# Patient Record
Sex: FEMALE | Race: WHITE | NOT HISPANIC OR LATINO | Employment: STUDENT | URBAN - METROPOLITAN AREA
[De-identification: names, ages, dates, MRNs, and addresses within clinical notes are randomized per-mention and may not be internally consistent; named-entity substitution may affect disease eponyms.]

---

## 2017-01-05 ENCOUNTER — OFFICE VISIT (OUTPATIENT)
Dept: PSYCHIATRY | Facility: CLINIC | Age: 22
End: 2017-01-05
Payer: COMMERCIAL

## 2017-01-05 VITALS
HEART RATE: 102 BPM | DIASTOLIC BLOOD PRESSURE: 62 MMHG | WEIGHT: 107 LBS | BODY MASS INDEX: 20.2 KG/M2 | HEIGHT: 61 IN | SYSTOLIC BLOOD PRESSURE: 106 MMHG

## 2017-01-05 DIAGNOSIS — F32.89 DEPRESSIVE DISORDER, NOT ELSEWHERE CLASSIFIED: ICD-10-CM

## 2017-01-05 DIAGNOSIS — F41.1 GENERALIZED ANXIETY DISORDER: Primary | ICD-10-CM

## 2017-01-05 PROCEDURE — 99214 OFFICE O/P EST MOD 30 MIN: CPT | Mod: S$GLB,,, | Performed by: PSYCHIATRY & NEUROLOGY

## 2017-01-05 PROCEDURE — 1159F MED LIST DOCD IN RCRD: CPT | Mod: S$GLB,,, | Performed by: PSYCHIATRY & NEUROLOGY

## 2017-01-05 PROCEDURE — 99999 PR PBB SHADOW E&M-EST. PATIENT-LVL III: CPT | Mod: PBBFAC,,, | Performed by: PSYCHIATRY & NEUROLOGY

## 2017-01-05 RX ORDER — PAROXETINE 10 MG/1
10 TABLET, FILM COATED ORAL EVERY MORNING
Qty: 30 TABLET | Refills: 1 | Status: SHIPPED | OUTPATIENT
Start: 2017-01-05 | End: 2017-02-09 | Stop reason: SDUPTHER

## 2017-01-05 NOTE — PROGRESS NOTES
01/05/2017  1:30 PM  Jolynn Bunch  [id number]          Psychiatry Clinic Note    SUBJECTIVE  Jolynn Bunch is a 21 y.o. old female who presents to clinic today for follow up of UMESH and depression. She notes that she is struggling with having her  away from home. She notes that she was happy to see him over the holidays. She notes that she has been somatizing her anxiety in the form of nausea and vomiting. She notes that this is a new experience for her. We discuss attempting a trial of Paxil 10 mg PO daily. She notes that she was formerly attempted on Lexapro and suffered from suicidal ideation. We discuss in depth the dangers of starting Paxil and it's similarities to Lexapro. We also discuss how she was on monotherapy with Lexapro when she experienced this suicidal ideation. The pt is able to contract for safety and plans to present to the ED if she experiences SI. We further discuss the risks, benefits, side effects, inherent unpredictability and alternatives to initiating this medication. She opts to initiate this medication and is noted to have capacity to make medical decisions. We plan to f/u in 4-6 weeks. Overall, she denies SI, HI and AVH.     PSYCHIATRIC ROS  Denies: delusions, paranoia,  periods of persistently elevated/expansive or irritable mood and/or increased goal directed behavior.    Issues/problems with:   Sleep Yes  Appetite changes no  Low energy no  Poor concentration no  Psychomotor agitation no  Suicidal ideation no  Depressed mood no  Anhedonia some  Anxiety yes  Worry yes      CURRENT HOME PSYCHIATRIC MEDICATIONS  Wellbutrin XL 450mg PO qdaily  Xanax 0.5mg PO qdaily PRN  Propranolol 10 mg PO TID PRN   Trazodone 50 mg PO QHS PRN insomnia    Patient reports that she is tolerating medications as prescribed without any adverse side effects.     MEDICAL ROS  General ROS: negative  ENT ROS: negative  Cardiovascular ROS: no chest pain or dyspnea on exertion  Respiratory ROS: no cough,  shortness of breath, or wheezing  Gastrointestinal ROS: no abdominal pain, change in bowel habits, or black or bloody stools  Neurological ROS: no TIA or stroke symptoms  Dermatological ROS: negative  Endocrine ROS: negative      OBJECTIVE    Vitals:    01/05/17 0753   BP: 106/62   Pulse: 102       MENTAL STATUS EXAM  Appearance: casually dressed  Behavior: cooperative  Speech: normal rate, tone and volume  Thought process: Linear  Thought content:Denies SKIP denies AVH  Mood: Good  Affect:  bright  Cognition: intact  Insight: intact  Judgment: intact    THERAPY    STATUS/PROGRESS Based on the examination today, the patient's problem(s) is/are worsening. New problems have not presented today. Co-morbidities are not complicating management of the primary condition. There are not active rule-out diagnoses for this patient at this time.       ASSESSMENT AND PLAN  UMESH, Depression    Continue with xanax 0.25 mg PO qdaily PRN  Continue wellbutrinXL to 450 mg PO qdaily  Continue Propranolol 10 mg PO TID PRN anxiety  Continue Trazodone 50 mg PO QHS PRN insomnia  Start Paxil 10 mg PO daily for anxiety and mood    Discussed diagnosis, risks and benefits of proposed treatment above vs alternative treatments vs no treatment, and potential side effects of these treatments. The patient expresses understanding of the above and displays the capacity to agree with this treatment given said understanding. Patient also agrees that, currently, the benefits outweigh the risks and would like to pursue treatment at this time.     Return to clinic 1.5 months or sooner if needed    Natalio Sheffield MD   Psychiatry Resident

## 2017-02-09 ENCOUNTER — OFFICE VISIT (OUTPATIENT)
Dept: PSYCHIATRY | Facility: CLINIC | Age: 22
End: 2017-02-09
Payer: COMMERCIAL

## 2017-02-09 VITALS
DIASTOLIC BLOOD PRESSURE: 57 MMHG | WEIGHT: 105 LBS | SYSTOLIC BLOOD PRESSURE: 112 MMHG | BODY MASS INDEX: 19.83 KG/M2 | HEIGHT: 61 IN | HEART RATE: 95 BPM

## 2017-02-09 DIAGNOSIS — F41.1 GENERALIZED ANXIETY DISORDER: Primary | ICD-10-CM

## 2017-02-09 PROCEDURE — 1160F RVW MEDS BY RX/DR IN RCRD: CPT | Mod: S$GLB,,, | Performed by: PSYCHIATRY & NEUROLOGY

## 2017-02-09 PROCEDURE — 99214 OFFICE O/P EST MOD 30 MIN: CPT | Mod: S$GLB,,, | Performed by: PSYCHIATRY & NEUROLOGY

## 2017-02-09 PROCEDURE — 99999 PR PBB SHADOW E&M-EST. PATIENT-LVL III: CPT | Mod: PBBFAC,,, | Performed by: PSYCHIATRY & NEUROLOGY

## 2017-02-09 RX ORDER — PAROXETINE 10 MG/1
10 TABLET, FILM COATED ORAL EVERY MORNING
Qty: 90 TABLET | Refills: 1 | Status: SHIPPED | OUTPATIENT
Start: 2017-02-09 | End: 2017-05-05 | Stop reason: SDUPTHER

## 2017-02-10 NOTE — PROGRESS NOTES
"02/10/2017  1:30 PM  Jolynn Bunch  [id number]          Psychiatry Clinic Note    SUBJECTIVE  Jolynn Bunch is a 21 y.o. old female who presents to clinic today for follow up of UMESH and depression. The pt notes that her anxiety is greatly improved with starting Paxil. She states "Is this how normal people feel; I'm doing great." She notes that she has been able to be more social and avoid seclusion. She also notes that she was surprised by her  who is home on a surprise visit from [employer] training. She notes that she has a strong desire to continue Paxil despite reporting a mild decrease in libido. We plan to continue her current medication regimen.  We plan to f/u in 4-6 weeks. Overall, she denies SI, HI and AVH.     PSYCHIATRIC ROS  Denies: delusions, paranoia,  periods of persistently elevated/expansive or irritable mood and/or increased goal directed behavior.    Issues/problems with:   Sleep Yes  Appetite changes no  Low energy no  Poor concentration no  Psychomotor agitation no  Suicidal ideation no  Depressed mood no  Anhedonia some  Anxiety yes  Worry yes      CURRENT HOME PSYCHIATRIC MEDICATIONS  Wellbutrin XL 450mg PO qdaily  Xanax 0.5mg PO qdaily PRN  Propranolol 10 mg PO TID PRN   Trazodone 50 mg PO QHS PRN insomnia  Paxil 10 mg PO daily    Patient reports that she is tolerating medications as prescribed without any adverse side effects.     MEDICAL ROS  General ROS: negative  ENT ROS: negative  Cardiovascular ROS: no chest pain or dyspnea on exertion  Respiratory ROS: no cough, shortness of breath, or wheezing  Gastrointestinal ROS: no abdominal pain, change in bowel habits, or black or bloody stools  Neurological ROS: no TIA or stroke symptoms  Dermatological ROS: negative  Endocrine ROS: negative      OBJECTIVE    Vitals:    02/09/17 0754   BP: (!) 112/57   Pulse: 95       MENTAL STATUS EXAM  Appearance: casually dressed  Behavior: cooperative  Speech: normal rate, tone and volume  Thought " process: Linear  Thought content:Denies SKIP denies AVH  Mood: Good  Affect:  bright  Cognition: intact  Insight: intact  Judgment: intact    THERAPY    STATUS/PROGRESS Based on the examination today, the patient's problem(s) is/are improving. New problems have not presented today. Co-morbidities are not complicating management of the primary condition. There are not active rule-out diagnoses for this patient at this time.     ASSESSMENT AND PLAN  UMESH, Depression    Continue with xanax 0.25 mg PO qdaily PRN  Continue wellbutrinXL to 450 mg PO qdaily  Continue Propranolol 10 mg PO TID PRN anxiety  Continue Trazodone 50 mg PO QHS PRN insomnia  Continue Paxil 10 mg PO daily for anxiety and mood    Discussed diagnosis, risks and benefits of proposed treatment above vs alternative treatments vs no treatment, and potential side effects of these treatments. The patient expresses understanding of the above and displays the capacity to agree with this treatment given said understanding. Patient also agrees that, currently, the benefits outweigh the risks and would like to pursue treatment at this time.     Return to clinic 1.5 months or sooner if needed    Natalio Sheffield MD   Psychiatry Resident

## 2017-03-24 ENCOUNTER — OFFICE VISIT (OUTPATIENT)
Dept: PSYCHIATRY | Facility: CLINIC | Age: 22
End: 2017-03-24
Payer: COMMERCIAL

## 2017-03-24 VITALS
HEART RATE: 109 BPM | SYSTOLIC BLOOD PRESSURE: 113 MMHG | WEIGHT: 109 LBS | DIASTOLIC BLOOD PRESSURE: 67 MMHG | HEIGHT: 61 IN | BODY MASS INDEX: 20.58 KG/M2

## 2017-03-24 DIAGNOSIS — F41.1 GENERALIZED ANXIETY DISORDER: Primary | ICD-10-CM

## 2017-03-24 PROCEDURE — 99214 OFFICE O/P EST MOD 30 MIN: CPT | Mod: S$GLB,,, | Performed by: PSYCHIATRY & NEUROLOGY

## 2017-03-24 PROCEDURE — 99999 PR PBB SHADOW E&M-EST. PATIENT-LVL III: CPT | Mod: PBBFAC,,, | Performed by: PSYCHIATRY & NEUROLOGY

## 2017-03-24 PROCEDURE — 1160F RVW MEDS BY RX/DR IN RCRD: CPT | Mod: S$GLB,,, | Performed by: PSYCHIATRY & NEUROLOGY

## 2017-03-24 RX ORDER — ALPRAZOLAM 0.25 MG/1
0.25 TABLET ORAL DAILY PRN
Qty: 90 TABLET | Refills: 1 | Status: SHIPPED | OUTPATIENT
Start: 2017-03-24 | End: 2017-05-05 | Stop reason: SDUPTHER

## 2017-03-24 NOTE — PROGRESS NOTES
03/24/2017  1:30 PM  Jolynn Bunch  [id number]          Psychiatry Clinic Note    SUBJECTIVE  Jolynn Bucnh is a 21 y.o. old female who presents to clinic today for follow up of UMESH and depression.     The pt notes that her anxiety is stable. She notes that she is doing well in school and coping well with her  being away. She is planning to move to [facility] after her graduation from college to be closer to her  who is being stationed in this state. She denies all side effects from her current medication regimen and wishes to continue the regimen. We plan to continue her current medication regimen.  We plan to f/u in 4-6 weeks. Overall, she denies SI, HI and AVH.     PSYCHIATRIC ROS  Denies: delusions, paranoia,  periods of persistently elevated/expansive or irritable mood and/or increased goal directed behavior.    Issues/problems with:   Sleep no  Appetite changes no  Low energy no  Poor concentration no  Psychomotor agitation no  Suicidal ideation no  Depressed mood no  Anhedonia some  Anxiety yes, significantly decreased  Worry yes      CURRENT HOME PSYCHIATRIC MEDICATIONS  Wellbutrin XL 450mg PO qdaily  Xanax 0.5mg PO qdaily PRN  Propranolol 10 mg PO TID PRN   Trazodone 50 mg PO QHS PRN insomnia  Paxil 10 mg PO daily    Patient reports that she is tolerating medications as prescribed without any adverse side effects.     MEDICAL ROS  General ROS: negative  ENT ROS: negative  Cardiovascular ROS: no chest pain or dyspnea on exertion  Respiratory ROS: no cough, shortness of breath, or wheezing  Gastrointestinal ROS: no abdominal pain, change in bowel habits, or black or bloody stools  Neurological ROS: no TIA or stroke symptoms  Dermatological ROS: negative  Endocrine ROS: negative      OBJECTIVE    Vitals:    03/24/17 1322   BP: 113/67   Pulse: 109       MENTAL STATUS EXAM  Appearance: casually dressed  Behavior: cooperative  Speech: normal rate, tone and volume  Thought process:  Linear  Thought content:Denies SKIP denies AVH  Mood: Good  Affect:  bright  Cognition: intact  Insight: intact  Judgment: intact    THERAPY    STATUS/PROGRESS Based on the examination today, the patient's problem(s) is/are improving. New problems have not presented today. Co-morbidities are not complicating management of the primary condition. There are not active rule-out diagnoses for this patient at this time.     ASSESSMENT AND PLAN  UMESH, Depression    Continue xanax 0.25 mg PO qdaily PRN  Continue wellbutrinXL to 450 mg PO qdaily  Continue Propranolol 10 mg PO TID PRN anxiety  Continue Trazodone 50 mg PO QHS PRN insomnia  Continue Paxil 10 mg PO daily for anxiety and mood    Discussed diagnosis, risks and benefits of proposed treatment above vs alternative treatments vs no treatment, and potential side effects of these treatments. The patient expresses understanding of the above and displays the capacity to agree with this treatment given said understanding. Patient also agrees that, currently, the benefits outweigh the risks and would like to pursue treatment at this time.     Return to clinic 1.5 months or sooner if needed    Natalio Sheffield MD   Psychiatry Resident

## 2017-05-05 ENCOUNTER — OFFICE VISIT (OUTPATIENT)
Dept: PSYCHIATRY | Facility: CLINIC | Age: 22
End: 2017-05-05
Payer: COMMERCIAL

## 2017-05-05 VITALS
WEIGHT: 113 LBS | HEART RATE: 97 BPM | HEIGHT: 61 IN | DIASTOLIC BLOOD PRESSURE: 52 MMHG | SYSTOLIC BLOOD PRESSURE: 93 MMHG | BODY MASS INDEX: 21.34 KG/M2

## 2017-05-05 DIAGNOSIS — F41.1 GENERALIZED ANXIETY DISORDER: ICD-10-CM

## 2017-05-05 DIAGNOSIS — F32.89 DEPRESSIVE DISORDER, NOT ELSEWHERE CLASSIFIED: Primary | ICD-10-CM

## 2017-05-05 PROCEDURE — 99999 PR PBB SHADOW E&M-EST. PATIENT-LVL II: CPT | Mod: PBBFAC,,, | Performed by: PSYCHIATRY & NEUROLOGY

## 2017-05-05 PROCEDURE — 99214 OFFICE O/P EST MOD 30 MIN: CPT | Mod: S$GLB,,, | Performed by: PSYCHIATRY & NEUROLOGY

## 2017-05-05 RX ORDER — BUPROPION HYDROCHLORIDE 150 MG/1
150 TABLET ORAL DAILY
Qty: 90 TABLET | Refills: 0 | Status: SHIPPED | OUTPATIENT
Start: 2017-05-05 | End: 2018-05-05

## 2017-05-05 RX ORDER — PAROXETINE 10 MG/1
10 TABLET, FILM COATED ORAL EVERY MORNING
Qty: 90 TABLET | Refills: 0 | Status: SHIPPED | OUTPATIENT
Start: 2017-05-05 | End: 2018-05-05

## 2017-05-05 RX ORDER — BUPROPION HYDROCHLORIDE 300 MG/1
300 TABLET ORAL DAILY
Qty: 90 TABLET | Refills: 0 | Status: SHIPPED | OUTPATIENT
Start: 2017-05-05 | End: 2018-05-05

## 2017-05-05 RX ORDER — TRAZODONE HYDROCHLORIDE 50 MG/1
50 TABLET ORAL NIGHTLY PRN
Qty: 90 TABLET | Refills: 0 | Status: SHIPPED | OUTPATIENT
Start: 2017-05-05 | End: 2018-05-05

## 2017-05-05 RX ORDER — ALPRAZOLAM 0.25 MG/1
0.25 TABLET ORAL DAILY PRN
Qty: 90 TABLET | Refills: 0 | Status: SHIPPED | OUTPATIENT
Start: 2017-05-05 | End: 2017-06-04

## 2017-05-05 RX ORDER — PROPRANOLOL HYDROCHLORIDE 10 MG/1
10 TABLET ORAL 3 TIMES DAILY PRN
Qty: 270 TABLET | Refills: 0 | Status: SHIPPED | OUTPATIENT
Start: 2017-05-05 | End: 2018-05-05

## 2017-05-05 NOTE — PROGRESS NOTES
05/05/2017  1:30 PM  Jolynn Bunch  [id number]          Psychiatry Clinic Note    SUBJECTIVE  Jolynn Bunch is a 21 y.o. old female who presents to clinic today for follow up of UMESH and depression.     The pt notes that her anxiety is stable. She notes that she is doing well in school and coping well with her  being away. She is planning to move to [facility] after her graduation from college to be closer to her  who is being stationed in this state. She denies all side effects from her current medication regimen and wishes to continue the regimen. We plan to continue her current medication regimen.  We plan this to be our last visit as she plans to move in the next 4 weeks. She remains involved in therapy. She plans to establish psychological and psychiatric care in [facility] through the . Overall, she denies SI, HI and AVH.     PSYCHIATRIC ROS  Denies: delusions, paranoia,  periods of persistently elevated/expansive or irritable mood and/or increased goal directed behavior.    Issues/problems with:   Sleep no  Appetite changes no  Low energy no  Poor concentration no  Psychomotor agitation no  Suicidal ideation no  Depressed mood no  Anhedonia some  Anxiety yes, significantly decreased  Worry yes      CURRENT HOME PSYCHIATRIC MEDICATIONS  Wellbutrin XL 450mg PO qdaily  Xanax 0.5mg PO qdaily PRN  Propranolol 10 mg PO TID PRN   Trazodone 50 mg PO QHS PRN insomnia  Paxil 10 mg PO daily    Patient reports that she is tolerating medications as prescribed without any adverse side effects.     MEDICAL ROS  General ROS: negative  ENT ROS: negative  Cardiovascular ROS: no chest pain or dyspnea on exertion  Respiratory ROS: no cough, shortness of breath, or wheezing  Gastrointestinal ROS: no abdominal pain, change in bowel habits, or black or bloody stools  Neurological ROS: no TIA or stroke symptoms  Dermatological ROS: negative  Endocrine ROS: negative      OBJECTIVE    Vitals:     05/05/17 1052   BP: (!) 93/52   Pulse: 97       MENTAL STATUS EXAM  Appearance: casually dressed   Behavior: cooperative  Speech: normal rate, tone and volume  Thought process: Linear  Thought content:Denies SKIP denies AVH  Mood: Good  Affect:  bright  Cognition: intact  Insight: intact  Judgment: intact    THERAPY    STATUS/PROGRESS Based on the examination today, the patient's problem(s) is/are improving. New problems have not presented today. Co-morbidities are not complicating management of the primary condition. There are not active rule-out diagnoses for this patient at this time.     ASSESSMENT AND PLAN  UMESH, Depression    Continue xanax 0.25 mg PO qdaily PRN  Continue wellbutrinXL to 450 mg PO qdaily  Continue Propranolol 10 mg PO TID PRN anxiety  Continue Trazodone 50 mg PO QHS PRN insomnia  Continue Paxil 10 mg PO daily for anxiety and mood    Discussed diagnosis, risks and benefits of proposed treatment above vs alternative treatments vs no treatment, and potential side effects of these treatments. The patient expresses understanding of the above and displays the capacity to agree with this treatment given said understanding. Patient also agrees that, currently, the benefits outweigh the risks and would like to pursue treatment at this time.     Return to clinic as needed     Natalio Sheffield MD   Psychiatry Resident

## 2020-01-20 ENCOUNTER — HOSPITAL ENCOUNTER (INPATIENT)
Dept: HOSPITAL 62 - LR | Age: 25
LOS: 4 days | Discharge: HOME | End: 2020-01-24
Attending: OBSTETRICS & GYNECOLOGY | Admitting: OBSTETRICS & GYNECOLOGY
Payer: OTHER GOVERNMENT

## 2020-01-20 DIAGNOSIS — F41.9: ICD-10-CM

## 2020-01-20 DIAGNOSIS — Z3A.39: ICD-10-CM

## 2020-01-20 DIAGNOSIS — O41.1230: ICD-10-CM

## 2020-01-20 LAB
ADD MANUAL DIFF: NO
APPEARANCE UR: (no result)
APTT PPP: YELLOW S
BARBITURATES UR QL SCN: NEGATIVE
BASOPHILS # BLD AUTO: 0 10^3/UL (ref 0–0.2)
BASOPHILS NFR BLD AUTO: 0.2 % (ref 0–2)
BILIRUB UR QL STRIP: NEGATIVE
EOSINOPHIL # BLD AUTO: 0.1 10^3/UL (ref 0–0.6)
EOSINOPHIL NFR BLD AUTO: 1.1 % (ref 0–6)
ERYTHROCYTE [DISTWIDTH] IN BLOOD BY AUTOMATED COUNT: 16.1 % (ref 11.5–14)
GLUCOSE UR STRIP-MCNC: NEGATIVE MG/DL
HCT VFR BLD CALC: 30.5 % (ref 36–47)
HGB BLD-MCNC: 10.3 G/DL (ref 12–15.5)
KETONES UR STRIP-MCNC: NEGATIVE MG/DL
LYMPHOCYTES # BLD AUTO: 1.2 10^3/UL (ref 0.5–4.7)
LYMPHOCYTES NFR BLD AUTO: 12.2 % (ref 13–45)
MCH RBC QN AUTO: 25.6 PG (ref 27–33.4)
MCHC RBC AUTO-ENTMCNC: 33.8 G/DL (ref 32–36)
MCV RBC AUTO: 76 FL (ref 80–97)
METHADONE UR QL SCN: NEGATIVE
MONOCYTES # BLD AUTO: 0.5 10^3/UL (ref 0.1–1.4)
MONOCYTES NFR BLD AUTO: 5.3 % (ref 3–13)
NEUTROPHILS # BLD AUTO: 7.8 10^3/UL (ref 1.7–8.2)
NEUTS SEG NFR BLD AUTO: 81.2 % (ref 42–78)
NITRITE UR QL STRIP: NEGATIVE
PCP UR QL SCN: NEGATIVE
PH UR STRIP: 7 [PH] (ref 5–9)
PLATELET # BLD: 182 10^3/UL (ref 150–450)
PROT UR STRIP-MCNC: 30 MG/DL
RBC # BLD AUTO: 4.02 10^6/UL (ref 3.72–5.28)
SP GR UR STRIP: 1.01
TOTAL CELLS COUNTED % (AUTO): 100 %
URINE AMPHETAMINES SCREEN: NEGATIVE
URINE BENZODIAZEPINES SCREEN: NEGATIVE
URINE COCAINE SCREEN: NEGATIVE
URINE MARIJUANA (THC) SCREEN: NEGATIVE
UROBILINOGEN UR-MCNC: NEGATIVE MG/DL (ref ?–2)
WBC # BLD AUTO: 9.6 10^3/UL (ref 4–10.5)

## 2020-01-20 PROCEDURE — 94760 N-INVAS EAR/PLS OXIMETRY 1: CPT

## 2020-01-20 PROCEDURE — C1765 ADHESION BARRIER: HCPCS

## 2020-01-20 PROCEDURE — 83615 LACTATE (LD) (LDH) ENZYME: CPT

## 2020-01-20 PROCEDURE — 85027 COMPLETE CBC AUTOMATED: CPT

## 2020-01-20 PROCEDURE — 81005 URINALYSIS: CPT

## 2020-01-20 PROCEDURE — 36415 COLL VENOUS BLD VENIPUNCTURE: CPT

## 2020-01-20 PROCEDURE — 86850 RBC ANTIBODY SCREEN: CPT

## 2020-01-20 PROCEDURE — 86592 SYPHILIS TEST NON-TREP QUAL: CPT

## 2020-01-20 PROCEDURE — 86804 HEP C AB TEST CONFIRM: CPT

## 2020-01-20 PROCEDURE — 86803 HEPATITIS C AB TEST: CPT

## 2020-01-20 PROCEDURE — 84550 ASSAY OF BLOOD/URIC ACID: CPT

## 2020-01-20 PROCEDURE — 86901 BLOOD TYPING SEROLOGIC RH(D): CPT

## 2020-01-20 PROCEDURE — 80307 DRUG TEST PRSMV CHEM ANLYZR: CPT

## 2020-01-20 PROCEDURE — 85025 COMPLETE CBC W/AUTO DIFF WBC: CPT

## 2020-01-20 PROCEDURE — 86900 BLOOD TYPING SEROLOGIC ABO: CPT

## 2020-01-20 PROCEDURE — 80053 COMPREHEN METABOLIC PANEL: CPT

## 2020-01-20 RX ADMIN — SODIUM CHLORIDE, SODIUM LACTATE, POTASSIUM CHLORIDE, AND CALCIUM CHLORIDE PRN MLS/HR: .6; .31; .03; .02 INJECTION, SOLUTION INTRAVENOUS at 19:43

## 2020-01-20 RX ADMIN — SODIUM CHLORIDE, SODIUM LACTATE, POTASSIUM CHLORIDE, AND CALCIUM CHLORIDE PRN MLS/HR: .6; .31; .03; .02 INJECTION, SOLUTION INTRAVENOUS at 23:42

## 2020-01-21 LAB
ADD MANUAL DIFF: NO
ALBUMIN SERPL-MCNC: 3.3 G/DL (ref 3.5–5)
ALP SERPL-CCNC: 238 U/L (ref 38–126)
ANION GAP SERPL CALC-SCNC: 10 MMOL/L (ref 5–19)
AST SERPL-CCNC: 25 U/L (ref 14–36)
BASOPHILS # BLD AUTO: 0.1 10^3/UL (ref 0–0.2)
BASOPHILS NFR BLD AUTO: 0.4 % (ref 0–2)
BILIRUB DIRECT SERPL-MCNC: 0.3 MG/DL (ref 0–0.4)
BILIRUB SERPL-MCNC: 0.5 MG/DL (ref 0.2–1.3)
BUN SERPL-MCNC: 8 MG/DL (ref 7–20)
CALCIUM: 8.8 MG/DL (ref 8.4–10.2)
CHLORIDE SERPL-SCNC: 106 MMOL/L (ref 98–107)
CO2 SERPL-SCNC: 19 MMOL/L (ref 22–30)
EOSINOPHIL # BLD AUTO: 0 10^3/UL (ref 0–0.6)
EOSINOPHIL NFR BLD AUTO: 0 % (ref 0–6)
ERYTHROCYTE [DISTWIDTH] IN BLOOD BY AUTOMATED COUNT: 16.7 % (ref 11.5–14)
GLUCOSE SERPL-MCNC: 86 MG/DL (ref 75–110)
HCT VFR BLD CALC: 32.9 % (ref 36–47)
HGB BLD-MCNC: 10.6 G/DL (ref 12–15.5)
LYMPHOCYTES # BLD AUTO: 1.3 10^3/UL (ref 0.5–4.7)
LYMPHOCYTES NFR BLD AUTO: 9.3 % (ref 13–45)
MCH RBC QN AUTO: 24.8 PG (ref 27–33.4)
MCHC RBC AUTO-ENTMCNC: 32.2 G/DL (ref 32–36)
MCV RBC AUTO: 77 FL (ref 80–97)
MONOCYTES # BLD AUTO: 0.8 10^3/UL (ref 0.1–1.4)
MONOCYTES NFR BLD AUTO: 5.4 % (ref 3–13)
NEUTROPHILS # BLD AUTO: 12.1 10^3/UL (ref 1.7–8.2)
NEUTS SEG NFR BLD AUTO: 84.9 % (ref 42–78)
PLATELET # BLD: 190 10^3/UL (ref 150–450)
POTASSIUM SERPL-SCNC: 4.4 MMOL/L (ref 3.6–5)
PROT SERPL-MCNC: 6.5 G/DL (ref 6.3–8.2)
RBC # BLD AUTO: 4.26 10^6/UL (ref 3.72–5.28)
TOTAL CELLS COUNTED % (AUTO): 100 %
URATE SERPL-MCNC: 5.7 MG/DL (ref 2.5–6.2)
WBC # BLD AUTO: 14.2 10^3/UL (ref 4–10.5)

## 2020-01-21 RX ADMIN — SODIUM CHLORIDE, SODIUM LACTATE, POTASSIUM CHLORIDE, AND CALCIUM CHLORIDE PRN MLS/HR: .6; .31; .03; .02 INJECTION, SOLUTION INTRAVENOUS at 07:33

## 2020-01-21 RX ADMIN — SODIUM CHLORIDE, SODIUM LACTATE, POTASSIUM CHLORIDE, AND CALCIUM CHLORIDE PRN MLS/HR: .6; .31; .03; .02 INJECTION, SOLUTION INTRAVENOUS at 12:04

## 2020-01-21 RX ADMIN — SODIUM CHLORIDE, SODIUM LACTATE, POTASSIUM CHLORIDE, AND CALCIUM CHLORIDE PRN MLS/HR: .6; .31; .03; .02 INJECTION, SOLUTION INTRAVENOUS at 17:29

## 2020-01-21 NOTE — ADMISSION PHYSICAL
=================================================================



=================================================================

Datetime Report Generated by CPN: 2020 07:12

   

   

=================================================================

CURRENT ADMISSION

=================================================================

   

Chief Complaint:  Scheduled Induction of Labor

Indication for Induction:  Gestational HTN

Admit Impression :  Term, Intrauterine Pregnancy; Induction of Labor

Admit Plan:  Admit to Unit; Initiate Labor Induction Protocol

   

=================================================================

ALLERGIES

=================================================================

   

Medication Allergies:  No

Medication Allergies:  No Known Allergies (2020)

Latex:  No Latex Allergies

Food Allergies:  none

Environmental Allergies:  None

   

=================================================================

OBSTETRICAL HISTORY

=================================================================

   

EDC:  2020 00:00

:  1

Para:  0

Term:  0

:  0

SAB:  0

IAB:  0

Ectopic:  0

Livin

Cesareans:  0

VBACs:  0

Multiple Births:  0

Gestational Diabetes:  No

Rh Sensitization:  No

Incompetent Cervix:  No

TWILA:  No

Infertility:  No

ART Treatment:  No

Uterine Anomaly:  No

IUGR:  No

Hx Previous C/S:  No

Macrosomia:  No

Hx Loss/Stillborn:  No

PIH:  No

Hx  Death:  No

Placenta Previa/Abruption:  No

Depression/PP Depression:  No

PTL/PROM:  No

Post Partum Hemorrhage:  No

Current Pregnancy Procedures:  Ultrasound; NST

Obstetrical History Comments:  G1 - Current

   

=================================================================

***SEE PRENATAL RECORDS***

=================================================================

   

Alcohol:  No

Marijuana :  No

Cocaine:  No

Other Illicit Drugs:  No

Cigarettes:  Never Smoker. [id number]

   

=================================================================

MEDICAL HISTORY

=================================================================

   

Diabetes:  No

Blood Transfusion:  No

Pulmonary Disease (Asthma, TB):  No

Breast Disease:  No

Hypertension:  Yes

Gyn Surgery:  No

Heart Disease:  No

Hosp/Surgery:  No

Autoimmune Disorder:  No

Anesthetic Complications:  No

Kidney Disease:  No

Abnormal Pap Smear:  No

Neuro/Epilepsy:  No

Psychiatric Disorders:  Yes

Other Medical Diseases:  No

Hepatitis/Liver Disease:  No

Significant Family History:  No

Varicosities/Phlebitis:  No

Trauma/Violence :  No

Thyroid Dysfunction:  No

Medical History Comments:  Gestational HTN, Generalized Anxiety Disorder

   

=================================================================

INFECTIOUS HISTORY

=================================================================

   

Gonorrhea:  No

Genital Herpes:  No

Chlamydia:  No

Tuberculosis:  No

Syphilis:  No

Hepatitis:  No

HIV/AIDS Exposure:  No

Rash or Viral Illness:  No

HPV:  No

   

=================================================================

PHYSICAL EXAM

=================================================================

   

General:  Normal

HEENT:  Normal

Neurologic:  Normal

Thyroid:  Normal

Heart:  Normal

Lungs:  Normal

Breast:  Normal

Back:  Normal

Abdomen:  Normal

Genitourinary Exam:  Normal

Extremities:  Normal

DTRs:  Normal

Pelvic Type:  Adequate

Vital Signs:  Reviewed

   

=================================================================

VAGINAL EXAM

=================================================================

   

Dilatation:  3

Effacement:  80

Station:  -1

   

=================================================================

MEMBRANES

=================================================================

   

Pooling:  Negative

Membranes:  Intact

   

=================================================================

FETUS A

=================================================================

   

EGA:  38.6

Monitoring:  External US

FHR- Baseline:  150

Variability:  Moderate 6-25bpm

Accelerations:  15X15

Decelerations:  None

FHR Category:  Category I

Estimated Fetal Weight (gm):  3500

Fetal Presentation:  Vertex

   

=================================================================

PLANS FOR LABOR AND DELIVERY

=================================================================

   

Labor and Delivery:  None

Pain Management:  Epidural

Feeding Preference:  Breast

Circumcision:  N/A

   

=================================================================

INFORMED CONSENT

=================================================================

   

Signature:  Electronically signed by Karyn Garduno MD ([email or web address]) on

   2020 at 07:12  with User ID: [email or web address]

## 2020-01-22 LAB
ERYTHROCYTE [DISTWIDTH] IN BLOOD BY AUTOMATED COUNT: 16.6 % (ref 11.5–14)
HCT VFR BLD CALC: 28.5 % (ref 36–47)
HGB BLD-MCNC: 9.3 G/DL (ref 12–15.5)
MCH RBC QN AUTO: 25.3 PG (ref 27–33.4)
MCHC RBC AUTO-ENTMCNC: 32.6 G/DL (ref 32–36)
MCV RBC AUTO: 78 FL (ref 80–97)
PLATELET # BLD: 187 10^3/UL (ref 150–450)
RBC # BLD AUTO: 3.67 10^6/UL (ref 3.72–5.28)
WBC # BLD AUTO: 19.8 10^3/UL (ref 4–10.5)

## 2020-01-22 RX ADMIN — CLINDAMYCIN PHOSPHATE SCH MLS/HR: 18 INJECTION, SOLUTION INTRAVENOUS at 03:41

## 2020-01-22 RX ADMIN — KETOROLAC TROMETHAMINE SCH MG: 30 INJECTION, SOLUTION INTRAMUSCULAR at 18:03

## 2020-01-22 RX ADMIN — OXYCODONE AND ACETAMINOPHEN PRN TAB: 5; 325 TABLET ORAL at 22:07

## 2020-01-22 RX ADMIN — FERROUS SULFATE TAB 325 MG (65 MG ELEMENTAL FE) SCH MG: 325 (65 FE) TAB at 10:06

## 2020-01-22 RX ADMIN — AMPICILLIN SCH MLS/HR: 2 INJECTION, POWDER, FOR SOLUTION INTRAVENOUS at 10:04

## 2020-01-22 RX ADMIN — DOCUSATE SODIUM SCH MG: 100 CAPSULE, LIQUID FILLED ORAL at 18:03

## 2020-01-22 RX ADMIN — CLINDAMYCIN PHOSPHATE SCH MLS/HR: 18 INJECTION, SOLUTION INTRAVENOUS at 19:59

## 2020-01-22 RX ADMIN — KETOROLAC TROMETHAMINE SCH MG: 30 INJECTION, SOLUTION INTRAMUSCULAR at 10:05

## 2020-01-22 RX ADMIN — GENTAMICIN SULFATE SCH: 40 INJECTION, SOLUTION INTRAMUSCULAR; INTRAVENOUS at 03:30

## 2020-01-22 RX ADMIN — Medication SCH CAP: at 10:04

## 2020-01-22 RX ADMIN — AMPICILLIN SCH MLS/HR: 2 INJECTION, POWDER, FOR SOLUTION INTRAVENOUS at 22:04

## 2020-01-22 RX ADMIN — SERTRALINE HYDROCHLORIDE SCH MG: 50 TABLET ORAL at 10:06

## 2020-01-22 RX ADMIN — CLINDAMYCIN PHOSPHATE SCH MLS/HR: 18 INJECTION, SOLUTION INTRAVENOUS at 12:54

## 2020-01-22 RX ADMIN — AMPICILLIN SCH MLS/HR: 2 INJECTION, POWDER, FOR SOLUTION INTRAVENOUS at 15:24

## 2020-01-22 RX ADMIN — GENTAMICIN SULFATE SCH MLS/HR: 40 INJECTION, SOLUTION INTRAMUSCULAR; INTRAVENOUS at 18:03

## 2020-01-22 RX ADMIN — GENTAMICIN SULFATE SCH MLS/HR: 40 INJECTION, SOLUTION INTRAMUSCULAR; INTRAVENOUS at 11:35

## 2020-01-22 RX ADMIN — GENTAMICIN SULFATE SCH: 40 INJECTION, SOLUTION INTRAMUSCULAR; INTRAVENOUS at 11:59

## 2020-01-22 RX ADMIN — DOCUSATE SODIUM SCH MG: 100 CAPSULE, LIQUID FILLED ORAL at 10:06

## 2020-01-22 RX ADMIN — OXYCODONE AND ACETAMINOPHEN PRN TAB: 5; 325 TABLET ORAL at 03:13

## 2020-01-22 NOTE — PDOC PROGRESS REPORT
Subjective-OB


Progress Note for:: 20 - PODay #0, s/p Primary  for Failed IOL/ 

CPD.  Hx GHTN.  Pt doing well this morning, awake and smiling. A+, rubella 

Immune, breastfeeding





Physical Exam (OB)


Vital Signs: 


                                        











Temp Pulse Resp BP Pulse Ox


 


 98.7 F   105 H  16   123/95 H  99 


 


 20 08:45  20 08:45  20 08:45  20 08:45  20 08:45








                                 Intake & Output











 20





 06:59 06:59 06:59


 


Intake Total 498 2266 320


 


Output Total  200 200


 


Balance 498 2066 120


 


Weight 78.6 kg  














- General


General Appearance: Appears well, Alert


In distress: None





- 


Dressing Removed: Yes - DIONE


Incision: Well Approximated


Closure Type: Surgical Glue





- Lochia


Lochia Amount: Scant < 10 ml


Lochia Color: Rubra/Red





- Abdomen


Description: Soft


Hernia Present: No


Fundal Description: Firm


Fundal Height: u/u - u/2





- Respiratory


Respiratory Status: No respiratory distress





- Abdominal


Distension: No distension


Tenderness: Other - slightly tender on exam, appropriate





- Genitourinary


Genitourinary Note: 





pierce cath in place





- Extremities


Upper extremity: Normal inspection


Lower extremities: Edema





- Neurological


Cognition: Normal


Orientation: AAOx4





- Psychological


Associated symptoms: Normal affect





- Skin


Skin Temperature: Warm


Skin Moisture: Dry





Objective-Diagnostic


Laboratory: 


                                        





                                 20 02:34 





                                 20 18:59 





                                        











  20





  18:59 18:59 02:34


 


WBC   14.2 H  19.8 H


 


RBC   4.26  3.67 L


 


Hgb   10.6 L  9.3 L


 


Hct   32.9 L  28.5 L


 


MCV   77 L  78 L


 


MCH   24.8 L  25.3 L


 


MCHC   32.2  32.6


 


RDW   16.7 H  16.6 H


 


Plt Count   190  187


 


Seg Neutrophils %   84.9 H 


 


Sodium  134.9 L  


 


Potassium  4.4  


 


Chloride  106  


 


Carbon Dioxide  19 L  


 


Anion Gap  10  


 


BUN  8  


 


Creatinine  0.58  


 


Est GFR ( Amer)  > 60  


 


Glucose  86  


 


Uric Acid  5.7  


 


Calcium  8.8  


 


Total Bilirubin  0.5  


 


AST  25  


 


Alkaline Phosphatase  238 H  


 


Total Protein  6.5  


 


Albumin  3.3 L  














Assessment and Plan(PN)





- Assessment and Plan


(1) Arrest of descent, delivered, current hospitalization


Is this a current diagnosis for this admission?: Yes   





(2) Chorioamnionitis


Qualifiers: 


   Fetus number: single or unspecified fetus   Trimester: third trimester   

Qualified Code(s): O41.1230 - Chorioamnionitis, third trimester, not applicable 

or unspecified   


Is this a current diagnosis for this admission?: Yes   





(3) Deep transverse arrest


Qualifiers: 


   Fetus number: single or unspecified fetus   Qualified Code(s): O64.0XX0 - 

Obstructed labor due to incomplete rotation of fetal head, not applicable or 

unspecified   


Is this a current diagnosis for this admission?: Yes   





(4) Encounter for induction of labor


Is this a current diagnosis for this admission?: Yes   





(5) Gestational hypertension


Qualifiers: 


   Trimester: third trimester   Qualified Code(s): O13.3 - Gestational 

[pregnancy-induced] hypertension without significant proteinuria, third 

trimester   


Is this a current diagnosis for this admission?: Yes   





(6) S/P primary low transverse 


Is this a current diagnosis for this admission?: Yes   





(7) Thin meconium stained amniotic fluid


Is this a current diagnosis for this admission?: Yes   


Plan:: 





will d/c pierce cath this afternoon, ambulation encouraged. Routine PP orders and

Post Op orders





- Time Spent with Patient


Time with patient: Less than 15 minutes


Medications reviewed and adjusted accordingly: Yes





- Disposition


Anticipated Discharge: Home


Within: within 48 hours

## 2020-01-22 NOTE — DELIVERY SUMMARY
=================================================================

Del Sum A-C

=================================================================

Datetime Report Generated by CPN: 2020 03:19

   

   

=================================================================

DELIVERY PERSONNEL

=================================================================

   

DELIVERY PERSONNEL:  [id number]

Delivery Doctor::  Stefanie Duron MD

Anesthesiologist::  Devi Tejada MD

CRNA::  Joselo Anderson CRNA

Labor and Delivery Nurse::  Renetta Wu RN

Circulator::  Oly Williamson RN

 Nurse Practitioner::  AGGIE Child

Nursery Nurse::  Emiliana Negrete RN

Scrub Tech/CNA:  ST Hannah

Scrub Tech/CNA:  Aida Ferguson ST

   

=================================================================

MATERNAL INFORMATION

=================================================================

   

Delivery Anesthesia:  Epidural

Medications After Delivery:  Pitocin Drip 20 Units/1000ml NSS; Cytotec

   1000mcg Per Rectum/Vagina

Delivery QBL:  1005

Maternal Complications:  Chorioamnionitis; Prolonged Second Stage > 2

   Hrs

   

=================================================================

LABOR SUMMARY

=================================================================

   

EDC:  2020 00:00

No. Babies in Womb:  0

 Attempted:  No

Labor Anesthesia:  Epidural

   

=================================================================

LABOR INFORMATION

=================================================================

   

Reason for Induction:  Gestational Hypertension

Cervical Ripening Agents:  Cervidil

Oxytocin:  Induction

Group B Beta Strep:  Negative

Antibiotics # of Doses:  0

Antibiotics Time of Last Dose:  0

Name of Antibiotic Given:  0

Steroids Given:  None

Reason Steroids Not Administered:  Not Applicable

   

=================================================================

MEMBRANES

=================================================================

   

Membranes Rupture Method:  Artificial

Rupture of Membranes:  2020 09:23

Length of Rupture (hr):  15.18

Amniotic Fluid Color:  Moderate Meconium

Amniotic Fluid Amount:  Moderate

Amniotic Fluid Odor:  Normal

   

=================================================================

STAGES OF LABOR

=================================================================

   

Stage 3 hr:  0

Stage 3 min:  1

   

=================================================================

VAGINAL DELIVERY

=================================================================

   

Episiotomy:  None

Laceration Repair:  Not Applicable

Sponge Count Correct:  N/A

Sharps Count Correct:  N/A

   

=================================================================

CSECTION DELIVERY

=================================================================

   

Primary Indication:  Failure of Descent

CSection Urgency:  Emergency

CSection Incidence:  Primary

Labor:  Labor

Elective:  Elective

CSection Incision:  Lower Uterine Transverse; T Extension of Incision

CSection Incision- Other:  Cervical Laceration Extended

   

=================================================================

BABY A INFORMATION

=================================================================

   

Infant Delivery Date/Time:  2020 00:34

Method of Delivery:  

Born in Route :  No

:  N/A

Forceps:  N/A

Vacuum Extraction:  N/A

Shoulder Dystocia :  No

   

=================================================================

PRESENTATION/POSITION BABY A

=================================================================

   

Presentation:  Cephalic

Cephalic Presentation:  Vertex

Breech Presentation:  N/A

   

=================================================================

PLACENTA INFORMATION BABY A

=================================================================

   

Placenta Delivery Time :  2020 00:35

Placenta Method of Delivery:  Manual Removal

Placenta Status:  Delivered

   

=================================================================

APGAR SCORES BABY A

=================================================================

   

Heart Rate 1 min:  >100 bpm

Resp Effort 1 min:  Good Cry

Reflex Irritability 1 min:  Cough or Sneeze or Pulls Away

Muscle Tone 1 min:  Some Flexion of Extremities

Color 1 min:  Blue/Pale

Resuscitation Effort 1 min:  Tactile Stimulation

APGAR SCORE 1 MIN:  7

Heart Rate 5 min:  >100 bpm

Resp Effort 5 min:  Good Cry

Reflex Irritability 5 min:  Cough or Sneeze or Pulls Away

Muscle Tone 5 min:  Active Motion

Color 5 min:  Body Pink, Extremities Blue

Resuscitation Effort 5 min:  Tactile Stimulation

APGAR SCORE 5 MIN:  9

   

=================================================================

INFANT INFORMATION BABY A

=================================================================

   

Gestational Age at Delivery:  39.0

Gestational Status:  Full Term- 39- 40.6 Weeks

Infant Outcome :  Liveborn

Infant Condition :  Stable

Infant Sex:  Female

   

=================================================================

IDENTIFICATION BABY A

=================================================================

   

Infant Verification Date/Time:  2020 00:48

ID Band Number:  [id number]

Mother's Name Verified:  Yes

Infant Medical Record Number:  [id number]

RN Verifying Infant:  NingRN

Additional Verifying Personnel:  SHEREE Griffin

   

=================================================================

WEIGHT/LENGTH BABY A

=================================================================

   

Infant Birthweight (gm):  3070

Infant Weight (lb):  6

Infant Weight (oz):  12

Infant Length (in):  19.50

Infant Length (cm):  49.53

   

=================================================================

CORD INFORMATION BABY A

=================================================================

   

No. Cord Vessels:  3

Nuchal Cord :  N/A

Cord Blood Taken:  Yes-For Storage (Mom's Blood type +)

Infant Suction:  None

   

=================================================================

ASSESSMENT BABY A

=================================================================

   

Skin to Skin:  Yes

   

=================================================================

BABY B INFORMATION

=================================================================

   

 :  N/A

## 2020-01-22 NOTE — BRIEF OPERATIVE NOTE
BRIEF OPERATIVE REPORT


DATE OF SURGERY: 20


TIME OF SURGERY: 02:00


PREOPERATIVE DIAGNOSIS: Arrest of Descent, 39+0ega, GHTN


POSTOPERATIVE DIAGNOSIS: KELSEA - deep transverse arrest with uterine incision 

extension


SURGEON: SHO GUZMAN


1ST ASSISTANT: BRODERICK DUARTE


FINDINGS: VFI delivered at 0034.  weight 6#12oz, Apgars 7/9, normal bilateral 

tubes and ovaries,  Uterine extension from lower uterine segment in t shape into

cervix.  Bladder backfilled with sterile milk and no evidence of bladder injury.

 IVF 2200ml, UOP 250ml, QBL 1005ml, EBL 800ml


COMPLICATIONS: 


uterine extension


ESTIMATED BLOOD LOSS: 1005


TISSUE REMOVED OR ALTERED: placenta and cord


TECHNICAL PROCEDURE: Primary  section

## 2020-01-22 NOTE — WARNING SIGNS IN BABIES
=================================================================

VOD Warning Signs

=================================================================

Datetime Report Generated by [facility]: 01/22/2020 02:31

   

VOD#608 -Warning Signs in Babies:  Needs to be viewed.    (01/20/2020

   19:06:Valeria Gavin RN)

## 2020-01-22 NOTE — OPERATIVE REPORT
Operative Report


DATE OF SURGERY: 20


PREOPERATIVE DIAGNOSIS: Arrest of Descent, 39+0ega, GHTN, Meconium, Chorioamnio

nitis


POSTOPERATIVE DIAGNOSIS: KELSEA - deep transverse arrest


OPERATION: Primary  section


SURGEON: SHO GUZMAN


1ST ASSISTANT: BRODERICK DUARTE


ANESTHESIA: Epidural


TISSUE REMOVED OR ALTERED: placenta and cord


COMPLICATIONS: 


extension of uterine incision down the middle of cervix towards the vagina


ESTIMATED BLOOD LOSS: 800


QUANTITATIVE BLOOD LOSS: 1,005


INTRAOPERATIVE FINDINGS: VFI delivered at 0034.  weight 6#12oz, Apgars 7/9, 

normal bilateral tubes and ovaries,  Uterine extension from lower uterine 

segment in t shape into cervix.  Bladder backfilled with sterile milk and no 

evidence of bladder injury.  IVF 2200ml, UOP 250ml, QBL 1005ml, EBL 800ml


PROCEDURE: 


Anesthesia provider: [Joselo Anderson CRNA, Devi Tejada MD]





Urine output: [250ml]





IV fluids: [2200ml]





Indications: [25yo  at 39+0ega was brought in for induction due to GHTN at 

38+6ega and cervidil was initiated on 2020.  In the morning on 2020 

when cervidil was removed cvx was 3/80/-1 and AROM was performed.  Then pitocin 

was initiated to help promote more regular contractions and continue induction 

of labor process.  She was 8cm/c/0 by report at 1645 when IUPC was attempted to 

be placed by CNM.  Upon my exam at 1930 without contraction cvx 9/c/-1 and with 

contraction fetal head descends to 0 station.  Reviewed with patient concerns 

for reported fetal size two weeks ago 7#13oz and pelvis outlet is constricted 

with prominent pubic bone.  However, two hours later cervical exam was c/c/+1 

without contraction and patient was feeling immense urge to push.  She pushed 

for two hours and then station remains at +1 station]





Procedure: The patient was taken to the operating room where spinal anesthesia 

was obtained and found to be adequate.  She was then prepped and draped in the 

normal sterile fashion and placed in the dorsal supine position with a leftward 

tilt.  A Pfannenstiel skin incision was then made and carried through to the 

underlying layers of the fascia with the scalpel.  The fascia was incised in the

midline and the incision extended laterally with the Ford scissors.  The 

superior aspect of the fascial incision was then grasped with Nereida clamps 

elevated and the underlying rectus muscles dissected off [bluntly].  Attention 

was then turned to the inferior aspect of the fascial incision which in a keshawn

lar fashion was grasped, tented up with Kocher clamps, and the rectus muscles 

dissected off [bluntly].  The rectus muscles were then  in the midline 

and the peritoneum at the amount identified and entered [bluntly].  The 

peritoneal incision was then extended superiorly and inferiorly with good 

visualization of the bladder.  The bladder blade was inserted and the 

vesicouterine peritoneum identified grasped with Japanese pickups and entered 

sharply with the Metzenbaum scissors.  This incision was then extended laterally

 with the Metzenbaum scissors and a bladder flap created digitally.  The bladder

 blade was then reinserted and the lower uterine segment incised in a transverse

 fashion with the scalpel.  The uterine incision was then extended bluntly.  The

 bladder blade was removed and the infant's head was delivered from cephalic 

presentation atraumatically.  The nose and mouth were suctioned and the cord 

doubly clamped and cut.  And the infant was handed off to waiting pediatricians.





The placenta was then delivered spontaneously and the uterus exteriorized and 

cleared of all clots and debris.  The uterine incision was then repaired with 1-

0 Vicryl in a running locked fashion.  A second layer of the same suture was 

used to obtain hemostasis via imbrication of the initial layer.  The bladder 

flap was then repaired with 3-0 chromic in a running fashion.  The uterus was 

returned to the patient's abdomen and Interceed was placed overlying the uterine

 incision to prevent adhesions.  The gutters were cleared of all clots and 

debris.  All operative sites were noted to be hemostatic.  The fascia was 

reapproximated with 0 Vicryl in a running fashion from each lateral edge to the 

midline.  The skin was closed with 3-0 Monocryl in a running subcuticular 

fashion with overlying Dermabond for additional dressing as well as wound 

closure.  The patient tolerated the procedure well.  Sponge lap needle and 

instrument counts are correct -2.  2 g of Ancef were given prior to skin 

incision.  The patient was taken to the recovery area awake and in stable 

condition.

## 2020-01-23 LAB
ERYTHROCYTE [DISTWIDTH] IN BLOOD BY AUTOMATED COUNT: 17.3 % (ref 11.5–14)
HCT VFR BLD CALC: 25 % (ref 36–47)
HGB BLD-MCNC: 8.4 G/DL (ref 12–15.5)
MCH RBC QN AUTO: 26 PG (ref 27–33.4)
MCHC RBC AUTO-ENTMCNC: 33.6 G/DL (ref 32–36)
MCV RBC AUTO: 78 FL (ref 80–97)
PLATELET # BLD: 143 10^3/UL (ref 150–450)
RBC # BLD AUTO: 3.23 10^6/UL (ref 3.72–5.28)
WBC # BLD AUTO: 10.6 10^3/UL (ref 4–10.5)

## 2020-01-23 RX ADMIN — IBUPROFEN SCH MG: 800 TABLET, FILM COATED ORAL at 23:13

## 2020-01-23 RX ADMIN — GENTAMICIN SULFATE SCH MLS/HR: 40 INJECTION, SOLUTION INTRAMUSCULAR; INTRAVENOUS at 09:34

## 2020-01-23 RX ADMIN — CLINDAMYCIN PHOSPHATE SCH MLS/HR: 18 INJECTION, SOLUTION INTRAVENOUS at 11:22

## 2020-01-23 RX ADMIN — DOCUSATE SODIUM SCH MG: 100 CAPSULE, LIQUID FILLED ORAL at 09:34

## 2020-01-23 RX ADMIN — GENTAMICIN SULFATE SCH MLS/HR: 40 INJECTION, SOLUTION INTRAMUSCULAR; INTRAVENOUS at 01:47

## 2020-01-23 RX ADMIN — AMPICILLIN SCH MLS/HR: 2 INJECTION, POWDER, FOR SOLUTION INTRAVENOUS at 13:11

## 2020-01-23 RX ADMIN — SERTRALINE HYDROCHLORIDE SCH MG: 50 TABLET ORAL at 09:34

## 2020-01-23 RX ADMIN — FERROUS SULFATE TAB 325 MG (65 MG ELEMENTAL FE) SCH MG: 325 (65 FE) TAB at 09:34

## 2020-01-23 RX ADMIN — Medication SCH CAP: at 09:34

## 2020-01-23 RX ADMIN — IBUPROFEN SCH MG: 800 TABLET, FILM COATED ORAL at 11:22

## 2020-01-23 RX ADMIN — IBUPROFEN SCH MG: 800 TABLET, FILM COATED ORAL at 05:36

## 2020-01-23 RX ADMIN — IBUPROFEN SCH MG: 800 TABLET, FILM COATED ORAL at 17:56

## 2020-01-23 RX ADMIN — DOCUSATE SODIUM SCH MG: 100 CAPSULE, LIQUID FILLED ORAL at 17:56

## 2020-01-23 RX ADMIN — CLINDAMYCIN PHOSPHATE SCH MLS/HR: 18 INJECTION, SOLUTION INTRAVENOUS at 03:30

## 2020-01-23 RX ADMIN — AMPICILLIN SCH MLS/HR: 2 INJECTION, POWDER, FOR SOLUTION INTRAVENOUS at 05:35

## 2020-01-23 NOTE — PDOC PROGRESS REPORT
Subjective-OB


Progress Note for:: 20





Physical Exam (OB)


Vital Signs: 


                                        











Temp Pulse Resp BP Pulse Ox


 


 98.3 F   94   16   121/81   98 


 


 20 07:32  20 07:32  20 07:32  20 07:32  20 07:32








                                 Intake & Output











 20





 06:59 06:59 06:59


 


Intake Total 2266 1829 


 


Output Total 200 1800 


 


Balance 2066 29 














- PIH/Pre-Eclampsia


Headache: Absent


Epigastric Pain: No


Visual Changes: No





- 


Dressing Removed:  - DIONE


Incision: Well Approximated


Closure Type: Surgical Glue





- Lochia


Lochia Amount: Scant < 10 ml


Lochia Color: Rubra/Red





- Abdomen


Description: Tender, Soft


Hernia Present: No


Bowel Sounds: Normoactive


Flatus Presence: Present


Stool: No


Fundal Description: Firm


Fundal Height: u/u - u/2





Objective-Diagnostic


Laboratory: 


                                        





                                 20 06:38 





                                 20 18:59 





                                        











  20





  06:38


 


WBC  10.6 H


 


RBC  3.23 L


 


Hgb  8.4 L


 


Hct  25.0 L


 


MCV  78 L


 


MCH  26.0 L


 


MCHC  33.6


 


RDW  17.3 H


 


Plt Count  143 L














Assessment and Plan(PN)





- Time Spent with Patient


Medications reviewed and adjusted accordingly: Yes





- Disposition


Anticipated Discharge: Home

## 2020-01-24 VITALS — DIASTOLIC BLOOD PRESSURE: 86 MMHG | SYSTOLIC BLOOD PRESSURE: 135 MMHG

## 2020-01-24 LAB — HCV AB SER IA-ACNC: <0.1 S/CO RATIO (ref 0–0.9)

## 2020-01-24 RX ADMIN — IBUPROFEN SCH MG: 800 TABLET, FILM COATED ORAL at 05:19

## 2020-01-24 RX ADMIN — Medication SCH: at 11:15

## 2020-01-24 RX ADMIN — DOCUSATE SODIUM SCH: 100 CAPSULE, LIQUID FILLED ORAL at 11:15

## 2020-01-24 RX ADMIN — FERROUS SULFATE TAB 325 MG (65 MG ELEMENTAL FE) SCH: 325 (65 FE) TAB at 11:15

## 2020-01-24 NOTE — PDOC DISCHARGE SUMMARY
Impression





- Admit/DC Date/PCP


Admission Date/Primary Care Provider: 


  20 18:37





  





Discharge Date: 20





- Discharge Diagnosis


(1) Arrest of descent, delivered, current hospitalization


Is this a current diagnosis for this admission?: Yes   





(2) Chorioamnionitis


Is this a current diagnosis for this admission?: Yes   





(3) Deep transverse arrest


Is this a current diagnosis for this admission?: Yes   





(4) Encounter for induction of labor


Is this a current diagnosis for this admission?: Yes   





(5) Gestational hypertension


Is this a current diagnosis for this admission?: Yes   





(6) S/P primary low transverse 


Is this a current diagnosis for this admission?: Yes   





(7) Thin meconium stained amniotic fluid


Is this a current diagnosis for this admission?: Yes   





- Additional Information


Resuscitation Status: Full Code


Discharge Diet: Regular


Discharge Activity: Balance Activity w/Rest, Pelvic Rest


[facility] 


[facility]


Prescriptions: 


Oxycodone HCl/Acetaminophen [Percocet 5-325 mg Tablet] 1 tab PO Q4HP PRN #30 

tablet


 PRN Reason: Pain Scale Of 3


Ibuprofen [Motrin 800 mg Tablet] 800 mg PO Q8HP PRN #60 tablet


 PRN Reason: pain 


Home Medications: 








Ferrous Sulfate [Iron] 325 mg PO DAILY 20 


Prenatal Vit No.130/Iron/Folic [Prenatal Tablet] 1 each PO DAILY 20 


Sertraline HCl 100 mg PO DAILY 20 


Ibuprofen [Motrin 800 mg Tablet] 800 mg PO Q8HP PRN #60 tablet 20 


Oxycodone HCl/Acetaminophen [Percocet 5-325 mg Tablet] 1 tab PO Q4HP PRN #30 

tablet 20 











Results


Laboratory Results: 


                                        











WBC  10.6 10^3/uL (4.0-10.5)  H  20  06:38    


 


RBC  3.23 10^6/uL (3.72-5.28)  L  20  06:38    


 


Hgb  8.4 g/dL (12.0-15.5)  L  20  06:38    


 


Hct  25.0 % (36.0-47.0)  L  20  06:38    


 


MCV  78 fl (80-97)  L  20  06:38    


 


MCH  26.0 pg (27.0-33.4)  L  20  06:38    


 


MCHC  33.6 g/dL (32.0-36.0)   20  06:38    


 


RDW  17.3 % (11.5-14.0)  H  20  06:38    


 


Plt Count  143 10^3/uL (150-450)  L  20  06:38    


 


Lymph % (Auto)  9.3 % (13-45)  L  20  18:59    


 


Mono % (Auto)  5.4 % (3-13)   20  18:59    


 


Eos % (Auto)  0.0 % (0-6)   20  18:59    


 


Baso % (Auto)  0.4 % (0-2)   20  18:59    


 


Absolute Neuts (auto)  12.1 10^3/uL (1.7-8.2)  H  20  18:59    


 


Absolute Lymphs (auto)  1.3 10^3/uL (0.5-4.7)   20  18:59    


 


Absolute Monos (auto)  0.8 10^3/uL (0.1-1.4)   20  18:59    


 


Absolute Eos (auto)  0.0 10^3/uL (0.0-0.6)   20  18:59    


 


Absolute Basos (auto)  0.1 10^3/uL (0.0-0.2)   20  18:59    


 


Seg Neutrophils %  84.9 % (42-78)  H  20  18:59    


 


Sodium  134.9 mmol/L (137-145)  L  20  18:59    


 


Potassium  4.4 mmol/L (3.6-5.0)   20  18:59    


 


Chloride  106 mmol/L ()   20  18:59    


 


Carbon Dioxide  19 mmol/L (22-30)  L  20  18:59    


 


Anion Gap  10  (5-19)   20  18:59    


 


BUN  8 mg/dL (7-20)   20  18:59    


 


Creatinine  0.58 mg/dL (0.52-1.25)   20  18:59    


 


Est GFR ( Amer)  > 60  (>60)   20  18:59    


 


Est GFR (MDRD) Non-Af  > 60  (>60)   20  18:59    


 


Glucose  86 mg/dL ()   20  18:59    


 


Uric Acid  5.7 mg/dL (2.5-6.2)   20  18:59    


 


Calcium  8.8 mg/dL (8.4-10.2)   20  18:59    


 


Total Bilirubin  0.5 mg/dL (0.2-1.3)   20  18:59    


 


Direct Bilirubin  0.3 mg/dL (0.0-0.4)   20  18:59    


 


Neonat Total Bilirubin  Not Reportable   20  18:59    


 


Neonat Direct Bilirubin  Not Reportable   20  18:59    


 


Neonat Indirect Bili  Not Reportable   20  18:59    


 


AST  25 U/L (14-36)   20  18:59    


 


ALT  12 U/L (<35)   20  18:59    


 


Alkaline Phosphatase  238 U/L ()  H  20  18:59    


 


Lactate Dehydrogenase  170 U/L (120-246)   20  18:59    


 


Total Protein  6.5 g/dL (6.3-8.2)   20  18:59    


 


Albumin  3.3 g/dL (3.5-5.0)  L  20  18:59    


 


Urine Color  YELLOW   20  18:50    


 


Urine Appearance  SLIGHTLY-CLOUDY   20  18:50    


 


Urine pH  7.0  (5.0-9.0)   20  18:50    


 


Ur Specific Gravity  1.009   20  18:50    


 


Urine Protein  30 mg/dL (NEGATIVE)  H  20  18:50    


 


Urine Glucose (UA)  NEGATIVE mg/dL (NEGATIVE)   20  18:50    


 


Urine Ketones  NEGATIVE mg/dL (NEGATIVE)   20  18:50    


 


Urine Blood  NEGATIVE  (NEGATIVE)   20  18:50    


 


Urine Nitrite  NEGATIVE  (NEGATIVE)   20  18:50    


 


Urine Bilirubin  NEGATIVE  (NEGATIVE)   20  18:50    


 


Urine Urobilinogen  NEGATIVE mg/dL (<2.0)   20  18:50    


 


Ur Leukocyte Esterase  SMALL  (NEGATIVE)  H  20  18:50    


 


Urine Ascorbic Acid  NEGATIVE  (NEGATIVE)   20  18:50    


 


Urine Opiates Screen  NEGATIVE   20  18:50    


 


Urine Methadone Screen  NEGATIVE   20  18:50    


 


Ur Barbiturates Screen  NEGATIVE   20  18:50    


 


Ur Phencyclidine Scrn  NEGATIVE   20  18:50    


 


Ur Amphetamines Screen  NEGATIVE   20  18:50    


 


U Benzodiazepines Scrn  NEGATIVE   20  18:50    


 


Urine Cocaine Screen  NEGATIVE   20  18:50    


 


U Marijuana (THC) Screen  NEGATIVE   20  18:50    


 


RPR  NONREACTIVE  (NONREACTIVE)   20  19:29    


 


Blood Type  A POSITIVE   20  19:29    


 


Antibody Screen  NEGATIVE   20  19:29